# Patient Record
Sex: FEMALE | Race: WHITE | NOT HISPANIC OR LATINO | Employment: UNEMPLOYED | URBAN - METROPOLITAN AREA
[De-identification: names, ages, dates, MRNs, and addresses within clinical notes are randomized per-mention and may not be internally consistent; named-entity substitution may affect disease eponyms.]

---

## 2017-05-19 ENCOUNTER — NON-PROVIDER VISIT (OUTPATIENT)
Dept: URGENT CARE | Facility: CLINIC | Age: 22
End: 2017-05-19

## 2017-05-19 DIAGNOSIS — Z02.1 PRE-EMPLOYMENT DRUG SCREENING: ICD-10-CM

## 2017-05-19 LAB
AMP AMPHETAMINE: NORMAL
COC COCAINE: NORMAL
INT CON NEG: NEGATIVE
INT CON POS: POSITIVE
MET METHAMPHETAMINES: NORMAL
OPI OPIATES: NORMAL
PCP PHENCYCLIDINE: NORMAL
POC DRUG COMMENT 753798-OCCUPATIONAL HEALTH: NORMAL
THC: NORMAL

## 2017-05-19 PROCEDURE — 80305 DRUG TEST PRSMV DIR OPT OBS: CPT | Performed by: NURSE PRACTITIONER

## 2017-06-25 ENCOUNTER — APPOINTMENT (OUTPATIENT)
Dept: RADIOLOGY | Facility: MEDICAL CENTER | Age: 22
End: 2017-06-25
Attending: EMERGENCY MEDICINE
Payer: MEDICAID

## 2017-06-25 ENCOUNTER — HOSPITAL ENCOUNTER (EMERGENCY)
Facility: MEDICAL CENTER | Age: 22
End: 2017-06-25
Attending: EMERGENCY MEDICINE
Payer: MEDICAID

## 2017-06-25 VITALS
BODY MASS INDEX: 38.64 KG/M2 | HEART RATE: 84 BPM | WEIGHT: 210 LBS | RESPIRATION RATE: 16 BRPM | HEIGHT: 62 IN | OXYGEN SATURATION: 97 % | DIASTOLIC BLOOD PRESSURE: 81 MMHG | SYSTOLIC BLOOD PRESSURE: 134 MMHG | TEMPERATURE: 98.2 F

## 2017-06-25 DIAGNOSIS — S52.124A CLOSED NONDISPLACED FRACTURE OF HEAD OF RIGHT RADIUS, INITIAL ENCOUNTER: ICD-10-CM

## 2017-06-25 PROCEDURE — 29125 APPL SHORT ARM SPLINT STATIC: CPT

## 2017-06-25 PROCEDURE — 700111 HCHG RX REV CODE 636 W/ 250 OVERRIDE (IP): Performed by: EMERGENCY MEDICINE

## 2017-06-25 PROCEDURE — 99284 EMERGENCY DEPT VISIT MOD MDM: CPT

## 2017-06-25 PROCEDURE — 96372 THER/PROPH/DIAG INJ SC/IM: CPT

## 2017-06-25 PROCEDURE — 302875 HCHG BANDAGE ACE 4 OR 6""

## 2017-06-25 PROCEDURE — 73090 X-RAY EXAM OF FOREARM: CPT | Mod: RT

## 2017-06-25 RX ORDER — METHADONE HYDROCHLORIDE 40 MG/1
40 TABLET ORAL DAILY
COMMUNITY

## 2017-06-25 RX ADMIN — HYDROMORPHONE HYDROCHLORIDE 1 MG: 1 INJECTION, SOLUTION INTRAMUSCULAR; INTRAVENOUS; SUBCUTANEOUS at 16:28

## 2017-06-25 RX ADMIN — HYDROMORPHONE HYDROCHLORIDE 1 MG: 1 INJECTION, SOLUTION INTRAMUSCULAR; INTRAVENOUS; SUBCUTANEOUS at 14:54

## 2017-06-25 ASSESSMENT — PAIN SCALES - WONG BAKER: WONGBAKER_NUMERICALRESPONSE: HURTS A WHOLE LOT

## 2017-06-25 NOTE — ED AVS SNAPSHOT
Cranberry Chic Access Code: [email or web address]  Expires: 7/25/2017  5:18 PM    Cranberry Chic  A secure, online tool to manage your health information     [email or web address]’s Cranberry Chic® is a secure, online tool that connects you to your personalized health information from the privacy of your home -- day or night - making it very easy for you to manage your healthcare. Once the activation process is completed, you can even access your medical information using the Cranberry Chic sophie, which is available for free in the Apple Sophie store or Google Play store.     Cranberry Chic provides the following levels of access (as shown below):   My Chart Features   [facility] Primary Care Doctor [facility]  Specialists [facility]  Urgent  Care Non-[facility]  Primary Care  Doctor   Email your healthcare team securely and privately 24/7 X X X X   Manage appointments: schedule your next appointment; view details of past/upcoming appointments X      Request prescription refills. X      View recent personal medical records, including lab and immunizations X X X X   View health record, including health history, allergies, medications X X X X   Read reports about your outpatient visits, procedures, consult and ER notes X X X X   See your discharge summary, which is a recap of your hospital and/or ER visit that includes your diagnosis, lab results, and care plan. X X       How to register for Cranberry Chic:  1. Go to  [email or web address].  2. Click on the Sign Up Now box, which takes you to the New Member Sign Up page. You will need to provide the following information:  a. Enter your Cranberry Chic Access Code exactly as it appears at the top of this page. (You will not need to use this code after you’ve completed the sign-up process. If you do not sign up before the expiration date, you must request a new code.)   b. Enter your date of birth.   c. Enter your home email address.   d. Click Submit, and follow the next screen’s instructions.  3. Create a Cranberry Chic ID. This will be your Cranberry Chic  login ID and cannot be changed, so think of one that is secure and easy to remember.  4. Create a [employer]gig password. You can change your password at any time.  5. Enter your Password Reset Question and Answer. This can be used at a later time if you forget your password.   6. Enter your e-mail address. This allows you to receive e-mail notifications when new information is available in 5gig.  7. Click Sign Up. You can now view your health information.    For assistance activating your [employer]gig account, call [phone]

## 2017-06-25 NOTE — ED AVS SNAPSHOT
Home Care Instructions                                                                                                                Elisha Hubbard   MRN: [id number]    Department:  [facility], Emergency Dept   Date of Visit:  6/25/2017            [facility], Emergency Dept    [address]    [place] [address]    Phone:  [phone]      You were seen by     Mao Payne M.D.      Your Diagnosis Was     Closed nondisplaced fracture of head of right radius, initial encounter     S52.124A       These are the medications you received during your hospitalization from 06/25/2017 1406 to 06/25/2017 1718     Date/Time Order Dose Route Action    06/25/2017 1454 HYDROmorphone (DILAUDID) injection 1 mg 1 mg Intramuscular Given    06/25/2017 1628 HYDROmorphone (DILAUDID) injection 1 mg 1 mg Intramuscular Given      Follow-up Information     1. Follow up with Earnest Be M.D. In 1 day.    Specialty:  Orthopaedics    Contact information    [address]  [address]  [place] [id number] [phone]        Medication Information     Review all of your home medications and newly ordered medications with your primary doctor and/or pharmacist as soon as possible. Follow medication instructions as directed by your doctor and/or pharmacist.     Please keep your complete medication list with you and share with your physician. Update the information when medications are discontinued, doses are changed, or new medications (including over-the-counter products) are added; and carry medication information at all times in the event of emergency situations.               Medication List      ASK your doctor about these medications        Instructions    Morning Afternoon Evening Bedtime    albuterol 108 (90 BASE) MCG/ACT Aers inhalation aerosol        Inhale 2 Puffs by mouth every 6 hours as needed for Shortness of Breath.   Dose:  2 Puff                        methadone 40 MG Tbso     Commonly known as:  DOLOPHINE        Take 40 mg by mouth every day.   Dose:  40 mg                                Procedures and tests performed during your visit     DX-FOREARM RIGHT        Discharge Instructions       Radial Head Fracture  A radial head fracture is a break of the smaller bone (radius) in the forearm. The head of this bone is the part near the elbow. These fractures commonly happen during a fall, when you land on an outstretched arm. These fractures are more common in middle aged adults and are common with a dislocation of the elbow.  SYMPTOMS   · Swelling of the elbow joint and pain on the outside of the elbow.  · Pain and difficulty in bending or straightening the elbow.  · Pain and difficulty in turning the palm of the hand up or down with the elbow bent.  DIAGNOSIS   Your caregiver may make this diagnosis by a physical exam. X-rays can confirm the type and amount of fracture. Sometimes a fracture that is not displaced cannot be seen on the original X-ray.  TREATMENT   Radial head fractures are classified according to the amount of movement (displacement) of parts from the normal position.   Type 1 Fractures  · Type 1 fractures are generally small fractures in which bone pieces remain together (nondisplaced fracture).  · The fracture may not be seen on initial X-rays. Usually if X-rays are repeated two to three weeks later, the fracture will show up. A splint or sling is used for a few days. Gentle early motion is used to prevent the elbow from becoming stiff. It should not be done vigorously or forced as this could displace the bone pieces.  Type 2 Fractures  · With type 2 fractures, bone pieces are slightly displaced and larger pieces of bone are broken off.  · If only a little displacement of the bone piece is present, splinting for 4 to 5 days usually works well. This is again followed with gentle active range of motion. Small fragments may be surgically removed.  · Large pieces of bone  that can be put back into place will sometimes be fixed with pins or screws to hold them until the bone is healed. If this cannot be done, the fragments are removed. For older, less active people, sometimes the entire radial head is removed if the wrist is not injured. The elbow and arm will still work fine. Soft tissue, tendon, and ligament injuries are corrected at the same time.  Type 3 Fractures  · Type 3 fractures have multiple broken pieces of bone that cannot be fixed. Surgery is usually needed to remove the broken bits of bone and what is left of the radial head. Soft-tissue damage is repaired. Gentle early motion is used to prevent the elbow from becoming stiff. Sometimes an artificial radial head can be used to prevent deformity if the elbow is unstable.  Rest, ice, elevation, immobilization, medications, and pain control are used in the early care.  HOME CARE INSTRUCTIONS   · Keep the injured part elevated while sitting or lying down. Keep the injury above the level of your heart (the center of the chest). This will decrease swelling and pain.  · Apply ice to the injury for 15-20 minutes, 03-04 times per day while awake, for 2 days. Put the ice in a plastic bag and place a towel between the bag of ice and your cast or splint.  · Move your fingers to avoid stiffness and minimize swelling.  · If you have a plaster or fiberglass cast:  ¨ Do not try to scratch the skin under the cast using sharp or pointed objects.  ¨ Check the skin around the cast every day. You may put lotion on any red or sore areas.  ¨ Keep your cast dry and clean.  · If you have a plaster splint:  ¨ Wear the splint as directed.  ¨ You may loosen the elastic around the splint if your fingers become numb, tingle, or turn cold or blue.  · Do not put pressure on any part of your cast or splint. It may break. Rest your cast only on a pillow for the first 24 hours until it is fully hardened.  · Your cast or splint can be protected during  bathing with a plastic bag. Do not lower the cast or splint into the water.  · Only take over-the-counter or prescription medicines for pain, discomfort, or fever as directed by your caregiver.  · Follow all instructions for follow-up with your caregiver. This includes any orthopedic referrals, physical therapy, and rehabilitation. Any delay in obtaining necessary care could result in a delay or failure of the bones to heal or permanent elbow stiffness.  · Do not overdo exercises. This could further damage your injury.  SEEK IMMEDIATE MEDICAL CARE IF:   · Your cast or splint gets damaged or breaks.  · You have more severe pain or swelling than you did before getting the cast.  · You have severe pain when stretching your fingers.  · There is a bad smell, new stains, and/or pus-like (purulent) drainage coming from under the cast.  · Your fingers or hand turn pale or blue, become cold, or you lose feeling.     This information is not intended to replace advice given to you by your health care provider. Make sure you discuss any questions you have with your health care provider.     Document Released: 10/09/2007 Document Revised: 01/08/2016 Document Reviewed: 11/16/2010  [employer] Interactive Patient Education ©2016 [employer]            Patient Information     Patient Information    Following emergency treatment: all patient requiring follow-up care must return either to a private physician or a clinic if your condition worsens before you are able to obtain further medical attention, please return to the emergency room.     Billing Information    At [facility], we work to make the billing process streamlined for our patients.  Our Representatives are here to answer any questions you may have regarding your hospital bill.  If you have insurance coverage and have supplied your insurance information to us, we will submit a claim to your insurer on your behalf.  Should you have any questions regarding your bill, we  can be reached online or by phone as follows:  Online: You are able pay your bills online or live chat with our representatives about any billing questions you may have. We are here to help Monday - Friday from 8:00am to 7:30pm and 9:00am - 12:00pm on Saturdays.  Please visit [email or web address]  for more information.   Phone:  [phone] or [phone]    Please note that your emergency physician, surgeon, pathologist, radiologist, anesthesiologist, and other specialists are not employed by [facility] and will therefore bill separately for their services.  Please contact them directly for any questions concerning their bills at the numbers below:     Emergency Physician Services:  [phone]  [facility]:  [phone]  [facility]:  [phone]  [facility]:  [phone]    1. Your final bill may vary from the amount quoted upon discharge if all procedures are not complete at that time, or if your doctor has additional procedures of which we are not aware. You will receive an additional bill if you return to the Emergency Department at [facility] for suture removal regardless of the facility of which the sutures were placed.     2. Please arrange for settlement of this account at the emergency registration.    3. All self-pay accounts are due in full at the time of treatment.  If you are unable to meet this obligation then payment is expected within 4-5 days.     4. If you have had radiology studies (CT, X-ray, Ultrasound, MRI), you have received a preliminary result during your emergency department visit. Please contact the radiology department [phone] to receive a copy of your final result. Please discuss the Final result with your primary physician or with the follow up physician provided.     Crisis Hotline:  [facility] Crisis Hotline:  3-014-TVJNCSV or 1-815.797.3895  [place] Crisis Hotline:    1-510.577.7160 or  404.851.1856         ED Discharge Follow Up Questions    1. In order to provide you with very good care, we would like to follow up with a phone call in the next few days.  May we have your permission to contact you?     YES /  NO    2. What is the best phone number to call you? (       )_____-__________    3. What is the best time to call you?      Morning  /  Afternoon  /  Evening                   Patient Signature:  ____________________________________________________________    Date:  ____________________________________________________________

## 2017-06-25 NOTE — ED AVS SNAPSHOT
6/25/2017    Elisha Hubbard  [address] [facility] [address]    Dear Elisha:    [facility] wants to ensure your discharge home is safe and you or your loved ones have had all of your questions answered regarding your care after you leave the hospital.    Below is a list of resources and contact information should you have any questions regarding your hospital stay, follow-up instructions, or active medical symptoms.    Questions or Concerns Regarding… Contact   Medical Questions Related to Your Discharge  (7 days a week, 8am-5pm) Contact a Nurse Care Coordinator   [phone]   Medical Questions Not Related to Your Discharge  (24 hours a day / 7 days a week)  Contact the Nurse Health Line   [phone]    Medications or Discharge Instructions Refer to your discharge packet   or contact your [facility] Primary Care Provider   [phone]   Follow-up Appointment(s) Schedule your appointment via [employer]   or contact Scheduling [phone]   Billing Review your statement via [employer]  or contact Billing [phone]   Medical Records Review your records via [employer]   or contact Medical Records [phone]     You may receive a telephone call within two days of discharge. This call is to make certain you understand your discharge instructions and have the opportunity to have any questions answered. You can also easily access your medical information, test results and upcoming appointments via the [employer] free online health management tool. You can learn more and sign up at [employer]. For assistance setting up your [employer] account, please call [phone].    Once again, we want to ensure your discharge home is safe and that you have a clear understanding of any next steps in your care. If you have any questions or concerns, please do not hesitate to contact us, we are here for you. Thank you for choosing [facility] for your healthcare needs.    Sincerely,    Your [facility] Healthcare Team

## 2017-06-25 NOTE — ED NOTES
Chief Complaint   Patient presents with   • T-5000 GLF     Patient bib EMS, states she stepped off curb and felt right knee cap dislocate then fell to the ground catching herself with her right arm. Patient states she feels her knee cap is back in place, but right forearm in pain. Splint placed in route. CMS intact. ERP to [facility].

## 2017-06-25 NOTE — DISCHARGE INSTRUCTIONS
Radial Head Fracture  A radial head fracture is a break of the smaller bone (radius) in the forearm. The head of this bone is the part near the elbow. These fractures commonly happen during a fall, when you land on an outstretched arm. These fractures are more common in middle aged adults and are common with a dislocation of the elbow.  SYMPTOMS   · Swelling of the elbow joint and pain on the outside of the elbow.  · Pain and difficulty in bending or straightening the elbow.  · Pain and difficulty in turning the palm of the hand up or down with the elbow bent.  DIAGNOSIS   Your caregiver may make this diagnosis by a physical exam. X-rays can confirm the type and amount of fracture. Sometimes a fracture that is not displaced cannot be seen on the original X-ray.  TREATMENT   Radial head fractures are classified according to the amount of movement (displacement) of parts from the normal position.   Type 1 Fractures  · Type 1 fractures are generally small fractures in which bone pieces remain together (nondisplaced fracture).  · The fracture may not be seen on initial X-rays. Usually if X-rays are repeated two to three weeks later, the fracture will show up. A splint or sling is used for a few days. Gentle early motion is used to prevent the elbow from becoming stiff. It should not be done vigorously or forced as this could displace the bone pieces.  Type 2 Fractures  · With type 2 fractures, bone pieces are slightly displaced and larger pieces of bone are broken off.  · If only a little displacement of the bone piece is present, splinting for 4 to 5 days usually works well. This is again followed with gentle active range of motion. Small fragments may be surgically removed.  · Large pieces of bone that can be put back into place will sometimes be fixed with pins or screws to hold them until the bone is healed. If this cannot be done, the fragments are removed. For older, less active people, sometimes the entire radial  head is removed if the wrist is not injured. The elbow and arm will still work fine. Soft tissue, tendon, and ligament injuries are corrected at the same time.  Type 3 Fractures  · Type 3 fractures have multiple broken pieces of bone that cannot be fixed. Surgery is usually needed to remove the broken bits of bone and what is left of the radial head. Soft-tissue damage is repaired. Gentle early motion is used to prevent the elbow from becoming stiff. Sometimes an artificial radial head can be used to prevent deformity if the elbow is unstable.  Rest, ice, elevation, immobilization, medications, and pain control are used in the early care.  HOME CARE INSTRUCTIONS   · Keep the injured part elevated while sitting or lying down. Keep the injury above the level of your heart (the center of the chest). This will decrease swelling and pain.  · Apply ice to the injury for 15-20 minutes, 03-04 times per day while awake, for 2 days. Put the ice in a plastic bag and place a towel between the bag of ice and your cast or splint.  · Move your fingers to avoid stiffness and minimize swelling.  · If you have a plaster or fiberglass cast:  ¨ Do not try to scratch the skin under the cast using sharp or pointed objects.  ¨ Check the skin around the cast every day. You may put lotion on any red or sore areas.  ¨ Keep your cast dry and clean.  · If you have a plaster splint:  ¨ Wear the splint as directed.  ¨ You may loosen the elastic around the splint if your fingers become numb, tingle, or turn cold or blue.  · Do not put pressure on any part of your cast or splint. It may break. Rest your cast only on a pillow for the first 24 hours until it is fully hardened.  · Your cast or splint can be protected during bathing with a plastic bag. Do not lower the cast or splint into the water.  · Only take over-the-counter or prescription medicines for pain, discomfort, or fever as directed by your caregiver.  · Follow all instructions for  follow-up with your caregiver. This includes any orthopedic referrals, physical therapy, and rehabilitation. Any delay in obtaining necessary care could result in a delay or failure of the bones to heal or permanent elbow stiffness.  · Do not overdo exercises. This could further damage your injury.  SEEK IMMEDIATE MEDICAL CARE IF:   · Your cast or splint gets damaged or breaks.  · You have more severe pain or swelling than you did before getting the cast.  · You have severe pain when stretching your fingers.  · There is a bad smell, new stains, and/or pus-like (purulent) drainage coming from under the cast.  · Your fingers or hand turn pale or blue, become cold, or you lose feeling.     This information is not intended to replace advice given to you by your health care provider. Make sure you discuss any questions you have with your health care provider.     Document Released: 10/09/2007 Document Revised: 01/08/2016 Document Reviewed: 11/16/2010  [employer] Interactive Patient Education ©2016 [employer]

## 2017-06-25 NOTE — ED PROVIDER NOTES
"ED Provider Note    CHIEF COMPLAINT  Chief Complaint   Patient presents with   • T-5000 GLF       HPI  Elisha Hubbard is a 22 y.o. female who presents for evaluation of the right forearm injury sustained just prior to arrival when she fell in the yard cell and landed on her right forearm. She states she's had a fracture in this region status post operative repair. She also states that she injured her right knee, she suspects it was another patella dislocation is spontaneously reduced. No weakness numbness or tingling, no neck or back pain and no other complaint offered by the patient at this time. She does report taking chronic methadone but missed her morning dose.    REVIEW OF SYSTEMS  Negative for back pain, neck pain, weakness, numbness, tingling.    PAST MEDICAL HISTORY   has a past medical history of Asthma; Kidney infection; Migraine; Migraine; and H. pylori infection.    SOCIAL HISTORY  Social History     Social History Main Topics   • Smoking status: Never Smoker    • Smokeless tobacco: Not on file   • Alcohol Use: No   • Drug Use: No   • Sexual Activity: Not on file       SURGICAL HISTORY   has past surgical history that includes appendectomy (11/28/2009); tonsillectomy and adenoidectomy; and appendectomy (11/2009).    CURRENT MEDICATIONS  I personally reviewed the medication list in the charting documentation.     ALLERGIES  Allergies   Allergen Reactions   • Nkda [No Known Drug Allergy]    • Tape        PHYSICAL EXAM  VITAL SIGNS: /76 mmHg  Pulse 78  Temp(Src) 36.9 °C (98.4 °F)  Resp 16  Ht 1.575 m (5' 2\")  Wt 95.255 kg (210 lb)  BMI 38.40 kg/m2  Constitutional: Alert in no apparent distress.  HENT: No signs of trauma.   Eyes: Conjunctiva normal, Non-icteric.   Chest: Normal nonlabored respirations  Skin: No erythema, No rash.   Musculoskeletal: Right forearm is splinted, once the splint is removed there is no evidence of deformity, neurovascularly intact distally with normal sensation in the " radial, ulnar and median nerve distributions of range of motion of the fingers. Tenderness involving the entire length of the forearm including elbow. No upper arm or shoulder tenderness. Additionally, there is a abrasion overlying the right knee but no deformity and neurovascularly intact distally.  Neurologic: Alert, No focal deficits noted.   Psychiatric: Affect normal, Judgment normal.    DIAGNOSTIC STUDIES / PROCEDURES    RADIOLOGY  DX-FOREARM RIGHT   Final Result      Right radial head/neck fracture            COURSE & MEDICAL DECISION MAKING  Pertinent Labs & Imaging studies reviewed. (See chart for details)    Encounter Summary: This is a 22 y.o. female with a right forearm injury status post ground level fall, fracture versus contusion, will obtain an x-ray. She is neurovascularly intact. Will medicate with a single dose of IM Dilaudid. Additionally she reports a patellar dislocation that reduced spontaneously, no need for imaging. She is neurovascular intact distally there is well.-------- x-ray reveals a radial head fracture, patient has been splinted in a sugar tong splint and a sling, will follow up with the orthopedic surgeon. I will not prescribe any opiate-based pain medication at this patient has a pain management physician and is on methadone. Strict return instructions discussed.      DISPOSITION: Discharge Home      FINAL IMPRESSION  1. Closed nondisplaced fracture of head of right radius, initial encounter        This dictation was created using voice recognition software. The accuracy of the dictation is limited to the abilities of the software. I expect there may be some errors of grammar and possibly content. The nursing notes were reviewed and certain aspects of this information were incorporated into this note.    Electronically signed by: Mao Payne, 6/25/2017 2:52 PM

## 2017-06-26 NOTE — ED NOTES
Pt given d/c instructions, given follow up instructions. All questions answered. Pt verbalized understanding. Pt d/c with family.

## 2018-02-20 ENCOUNTER — HOSPITAL ENCOUNTER (OUTPATIENT)
Facility: MEDICAL CENTER | Age: 23
End: 2018-02-20
Attending: NURSE PRACTITIONER
Payer: COMMERCIAL

## 2018-02-20 ENCOUNTER — NON-PROVIDER VISIT (OUTPATIENT)
Dept: URGENT CARE | Facility: CLINIC | Age: 23
End: 2018-02-20

## 2018-02-20 DIAGNOSIS — Z02.89 ENCOUNTER FOR OCCUPATIONAL HEALTH ASSESSMENT: ICD-10-CM

## 2018-02-20 PROCEDURE — 83655 ASSAY OF LEAD: CPT | Performed by: NURSE PRACTITIONER

## 2018-02-24 LAB — TEST NAME 95000: ABNORMAL

## 2018-07-18 ENCOUNTER — HOSPITAL ENCOUNTER (EMERGENCY)
Facility: MEDICAL CENTER | Age: 23
End: 2018-07-18
Attending: EMERGENCY MEDICINE
Payer: MEDICAID

## 2018-07-18 ENCOUNTER — APPOINTMENT (OUTPATIENT)
Dept: RADIOLOGY | Facility: MEDICAL CENTER | Age: 23
End: 2018-07-18
Attending: EMERGENCY MEDICINE
Payer: MEDICAID

## 2018-07-18 ENCOUNTER — OFFICE VISIT (OUTPATIENT)
Dept: URGENT CARE | Facility: CLINIC | Age: 23
End: 2018-07-18
Payer: MEDICAID

## 2018-07-18 VITALS
SYSTOLIC BLOOD PRESSURE: 117 MMHG | HEART RATE: 51 BPM | DIASTOLIC BLOOD PRESSURE: 71 MMHG | RESPIRATION RATE: 17 BRPM | WEIGHT: 221.56 LBS | OXYGEN SATURATION: 96 % | BODY MASS INDEX: 39.26 KG/M2 | HEIGHT: 63 IN | TEMPERATURE: 97.8 F

## 2018-07-18 VITALS
RESPIRATION RATE: 16 BRPM | BODY MASS INDEX: 40.82 KG/M2 | DIASTOLIC BLOOD PRESSURE: 76 MMHG | HEIGHT: 62 IN | OXYGEN SATURATION: 95 % | SYSTOLIC BLOOD PRESSURE: 118 MMHG | TEMPERATURE: 97.9 F | WEIGHT: 221.8 LBS | HEART RATE: 80 BPM

## 2018-07-18 DIAGNOSIS — R07.9 CHEST PAIN, UNSPECIFIED TYPE: ICD-10-CM

## 2018-07-18 DIAGNOSIS — R11.0 NAUSEA: ICD-10-CM

## 2018-07-18 DIAGNOSIS — F11.90 HEROIN USE: ICD-10-CM

## 2018-07-18 DIAGNOSIS — R10.13 EPIGASTRIC PAIN: ICD-10-CM

## 2018-07-18 DIAGNOSIS — F11.90 METHADONE USE: ICD-10-CM

## 2018-07-18 LAB
ALBUMIN SERPL BCP-MCNC: 4.9 G/DL (ref 3.2–4.9)
ALBUMIN/GLOB SERPL: 1.9 G/DL
ALP SERPL-CCNC: 82 U/L (ref 30–99)
ALT SERPL-CCNC: 13 U/L (ref 2–50)
ANION GAP SERPL CALC-SCNC: 11 MMOL/L (ref 0–11.9)
APTT PPP: 32.9 SEC (ref 24.7–36)
AST SERPL-CCNC: 15 U/L (ref 12–45)
BASOPHILS # BLD AUTO: 0.8 % (ref 0–1.8)
BASOPHILS # BLD: 0.09 K/UL (ref 0–0.12)
BILIRUB SERPL-MCNC: 0.7 MG/DL (ref 0.1–1.5)
BNP SERPL-MCNC: 19 PG/ML (ref 0–100)
BUN SERPL-MCNC: 11 MG/DL (ref 8–22)
CALCIUM SERPL-MCNC: 9.8 MG/DL (ref 8.5–10.5)
CHLORIDE SERPL-SCNC: 105 MMOL/L (ref 96–112)
CO2 SERPL-SCNC: 24 MMOL/L (ref 20–33)
CREAT SERPL-MCNC: 0.83 MG/DL (ref 0.5–1.4)
DEPRECATED D DIMER PPP IA-ACNC: <200 NG/ML(D-DU)
EKG IMPRESSION: NORMAL
EOSINOPHIL # BLD AUTO: 0.27 K/UL (ref 0–0.51)
EOSINOPHIL NFR BLD: 2.3 % (ref 0–6.9)
ERYTHROCYTE [DISTWIDTH] IN BLOOD BY AUTOMATED COUNT: 41.5 FL (ref 35.9–50)
GLOBULIN SER CALC-MCNC: 2.6 G/DL (ref 1.9–3.5)
GLUCOSE SERPL-MCNC: 106 MG/DL (ref 65–99)
HCT VFR BLD AUTO: 44.4 % (ref 37–47)
HGB BLD-MCNC: 14.6 G/DL (ref 12–16)
IMM GRANULOCYTES # BLD AUTO: 0.04 K/UL (ref 0–0.11)
IMM GRANULOCYTES NFR BLD AUTO: 0.3 % (ref 0–0.9)
INR PPP: 1.17 (ref 0.87–1.13)
LIPASE SERPL-CCNC: 12 U/L (ref 11–82)
LYMPHOCYTES # BLD AUTO: 3.42 K/UL (ref 1–4.8)
LYMPHOCYTES NFR BLD: 28.9 % (ref 22–41)
MCH RBC QN AUTO: 30.1 PG (ref 27–33)
MCHC RBC AUTO-ENTMCNC: 32.9 G/DL (ref 33.6–35)
MCV RBC AUTO: 91.5 FL (ref 81.4–97.8)
MONOCYTES # BLD AUTO: 0.39 K/UL (ref 0–0.85)
MONOCYTES NFR BLD AUTO: 3.3 % (ref 0–13.4)
NEUTROPHILS # BLD AUTO: 7.63 K/UL (ref 2–7.15)
NEUTROPHILS NFR BLD: 64.4 % (ref 44–72)
NRBC # BLD AUTO: 0 K/UL
NRBC BLD-RTO: 0 /100 WBC
PLATELET # BLD AUTO: 326 K/UL (ref 164–446)
PMV BLD AUTO: 10.5 FL (ref 9–12.9)
POTASSIUM SERPL-SCNC: 3.9 MMOL/L (ref 3.6–5.5)
PROT SERPL-MCNC: 7.5 G/DL (ref 6–8.2)
PROTHROMBIN TIME: 14.6 SEC (ref 12–14.6)
RBC # BLD AUTO: 4.85 M/UL (ref 4.2–5.4)
SODIUM SERPL-SCNC: 140 MMOL/L (ref 135–145)
TROPONIN I SERPL-MCNC: <0.01 NG/ML (ref 0–0.04)
TROPONIN I SERPL-MCNC: <0.01 NG/ML (ref 0–0.04)
WBC # BLD AUTO: 11.8 K/UL (ref 4.8–10.8)

## 2018-07-18 PROCEDURE — 93005 ELECTROCARDIOGRAM TRACING: CPT

## 2018-07-18 PROCEDURE — 96374 THER/PROPH/DIAG INJ IV PUSH: CPT

## 2018-07-18 PROCEDURE — 85379 FIBRIN DEGRADATION QUANT: CPT

## 2018-07-18 PROCEDURE — 700111 HCHG RX REV CODE 636 W/ 250 OVERRIDE (IP): Performed by: EMERGENCY MEDICINE

## 2018-07-18 PROCEDURE — 84484 ASSAY OF TROPONIN QUANT: CPT

## 2018-07-18 PROCEDURE — 80053 COMPREHEN METABOLIC PANEL: CPT

## 2018-07-18 PROCEDURE — 93005 ELECTROCARDIOGRAM TRACING: CPT | Performed by: EMERGENCY MEDICINE

## 2018-07-18 PROCEDURE — 99203 OFFICE O/P NEW LOW 30 MIN: CPT | Performed by: NURSE PRACTITIONER

## 2018-07-18 PROCEDURE — 85610 PROTHROMBIN TIME: CPT

## 2018-07-18 PROCEDURE — 71045 X-RAY EXAM CHEST 1 VIEW: CPT

## 2018-07-18 PROCEDURE — 83690 ASSAY OF LIPASE: CPT

## 2018-07-18 PROCEDURE — 83880 ASSAY OF NATRIURETIC PEPTIDE: CPT

## 2018-07-18 PROCEDURE — 99285 EMERGENCY DEPT VISIT HI MDM: CPT

## 2018-07-18 PROCEDURE — 85730 THROMBOPLASTIN TIME PARTIAL: CPT

## 2018-07-18 PROCEDURE — 96375 TX/PRO/DX INJ NEW DRUG ADDON: CPT

## 2018-07-18 PROCEDURE — 93000 ELECTROCARDIOGRAM COMPLETE: CPT | Performed by: NURSE PRACTITIONER

## 2018-07-18 PROCEDURE — 85025 COMPLETE CBC W/AUTO DIFF WBC: CPT

## 2018-07-18 RX ORDER — DIPHENHYDRAMINE HYDROCHLORIDE 50 MG/ML
12.5 INJECTION INTRAMUSCULAR; INTRAVENOUS ONCE
Status: COMPLETED | OUTPATIENT
Start: 2018-07-18 | End: 2018-07-18

## 2018-07-18 RX ORDER — KETOROLAC TROMETHAMINE 30 MG/ML
10 INJECTION, SOLUTION INTRAMUSCULAR; INTRAVENOUS ONCE
Status: DISCONTINUED | OUTPATIENT
Start: 2018-07-18 | End: 2018-07-18

## 2018-07-18 RX ORDER — DEXAMETHASONE SODIUM PHOSPHATE 10 MG/ML
10 INJECTION, SOLUTION INTRAMUSCULAR; INTRAVENOUS ONCE
Status: COMPLETED | OUTPATIENT
Start: 2018-07-18 | End: 2018-07-18

## 2018-07-18 RX ORDER — KETOROLAC TROMETHAMINE 30 MG/ML
15 INJECTION, SOLUTION INTRAMUSCULAR; INTRAVENOUS ONCE
Status: COMPLETED | OUTPATIENT
Start: 2018-07-18 | End: 2018-07-18

## 2018-07-18 RX ADMIN — DEXAMETHASONE SODIUM PHOSPHATE 10 MG: 10 INJECTION, SOLUTION INTRAMUSCULAR; INTRAVENOUS at 17:19

## 2018-07-18 RX ADMIN — DIPHENHYDRAMINE HYDROCHLORIDE 12.5 MG: 50 INJECTION, SOLUTION INTRAMUSCULAR; INTRAVENOUS at 17:20

## 2018-07-18 RX ADMIN — KETOROLAC TROMETHAMINE 15 MG: 30 INJECTION, SOLUTION INTRAMUSCULAR at 17:23

## 2018-07-18 RX ADMIN — PROCHLORPERAZINE EDISYLATE 10 MG: 5 INJECTION INTRAMUSCULAR; INTRAVENOUS at 17:19

## 2018-07-18 ASSESSMENT — ENCOUNTER SYMPTOMS
BACK PAIN: 1
HEMOPTYSIS: 0
COUGH: 0
CHILLS: 0
SHORTNESS OF BREATH: 1
ORTHOPNEA: 0
DIAPHORESIS: 0
PALPITATIONS: 1
DIZZINESS: 0
VOMITING: 0
ABDOMINAL PAIN: 1
WHEEZING: 0
BLOOD IN STOOL: 0
WEAKNESS: 0
CONSTIPATION: 0
NAUSEA: 1
MYALGIAS: 0
DIARRHEA: 0
SPUTUM PRODUCTION: 0
FLANK PAIN: 0
FEVER: 0

## 2018-07-18 ASSESSMENT — PAIN SCALES - GENERAL: PAINLEVEL_OUTOF10: 8

## 2018-07-18 NOTE — ED PROVIDER NOTES
"ED Provider Note    Scribed for Tolu Archer M.D. by Isaiah Castellanos. 7/18/2018,  4:38 PM.    CHIEF COMPLAINT  Chief Complaint   Patient presents with   • Chest Pain   • Shortness of Breath       HPI  Elisha Hubbard is a 23 y.o. female who presents to the Emergency Department complaining of chest pain onset yesterday morning. Patient states was clean for 5 days until she relapsed on heroin 5 days ago. She took the drug for two days and then stopped. Patient states yesterday morning, she woke up with her chest feeling heavy and full. She stayed laying down for a minute thinking it was heartburn. However, upon standing up she experienced excruciating mid-chest pain that radiates to the center of her back. This pain was intermittent throughout yesterday and has been constant today. Patient also notes having palpitations described as \"fluttering.\" These palpitations also trigger the chest pain. She also reports shortness of breath and coughing \"to catch my breath.\" She mentions having a migraine. She denies any recent measured fevers, or pain or swelling to her fingers. Patient mentions when she did heroin 5 years ago, she had a severe blood infection. Patient denies any history of blood clots. She notes being a methadone patient. She denies any known medication allergies.        REVIEW OF SYSTEMS  See HPI for further details. All other systems are negative.   C    PAST MEDICAL HISTORY   has a past medical history of ASTHMA; H. pylori infection; Kidney infection; Migraine; and Migraine.    SOCIAL HISTORY  Social History     Social History Main Topics   • Smoking status: Current Every Day Smoker     Packs/day: 0.50     Types: Cigarettes   • Smokeless tobacco: Never Used   • Alcohol use No   • Drug use: Yes     Types: Intravenous      Comment: heroin; in recovery, relapsed 4 days ago 7/14     History   Drug Use   • Types: Intravenous     Comment: heroin; in recovery, relapsed 4 days ago 7/14 " "      SURGICAL HISTORY   has a past surgical history that includes appendectomy (11/28/2009); tonsillectomy and adenoidectomy; and appendectomy (11/2009).    CURRENT MEDICATIONS  Home Medications     Reviewed by Johana Leyva R.N. (Registered Nurse) on 07/18/18 at 1337  Med List Status: Complete   Medication Last Dose Status   albuterol (VENTOLIN OR PROVENTIL) 108 (90 BASE) MCG/ACT AERS prn Active   methadone (DOLOPHINE) 40 MG TABLET SOLUBLE 7/18/2018 Active                ALLERGIES  Allergies   Allergen Reactions   • Latex Hives   • Nkda [No Known Drug Allergy]    • Tape        PHYSICAL EXAM  VITAL SIGNS: /71   Pulse 68   Temp 36.6 °C (97.8 °F)   Resp 15   Ht 1.6 m (5' 3\")   Wt 100.5 kg (221 lb 9 oz)   LMP 07/04/2018 (Within Days)   SpO2 99%   BMI 39.25 kg/m²   Pulse ox interpretation: I interpret this pulse ox as normal.  Constitutional: Alert in no apparent distress.  HENT: No signs of trauma, Bilateral external ears normal, Nose normal.   Eyes: Conjunctiva normal, Non-icteric.   Neck: Normal range of motion, Supple, No stridor.   Lymphatic: No lymphadenopathy noted.   Cardiovascular: Regular rate and rhythm, no murmurs.   Thorax & Lungs: Normal breath sounds, No respiratory distress, No wheezing, No chest tenderness.   Skin: Warm, Dry, No erythema, No rash.   Back: No midline bony tenderness.   Extremities: Intact distal pulses, No edema, No cyanosis.  Musculoskeletal: Good range of motion in all major joints. No or major deformities noted.   Neurologic: Alert , Normal motor function, Normal sensory function, No focal deficits noted.   Psychiatric: Affect normal, Judgment normal, Mood normal.       DIAGNOSTIC STUDIES / PROCEDURES    EKG #1  Interpreted by me    Rhythm:  Normal sinus rhythm   Rate: 59  Axis: normal  Intervals: normal  Ectopy: none  Conduction: normal  ST Segments: no acute change  T Waves: no acute change  Q Waves: none  Old EKG shows no significant changes.    EKG #2: "   Interpreted by me    Rhythm:  Normal sinus rhythm   Rate: 46  Axis: normal  Intervals: normal  Ectopy: none  Conduction: normal  ST Segments: no acute change  T Waves: no acute change  Q Waves: none  Old EKG from today shows no significant changes.    LABS  Labs Reviewed   CBC WITH DIFFERENTIAL - Abnormal; Notable for the following:        Result Value    WBC 11.8 (*)     MCHC 32.9 (*)     Neutrophils (Absolute) 7.63 (*)     All other components within normal limits    Narrative:     Indicate which anticoagulants the patient is on:->UNKNOWN   COMP METABOLIC PANEL - Abnormal; Notable for the following:     Glucose 106 (*)     All other components within normal limits    Narrative:     Indicate which anticoagulants the patient is on:->UNKNOWN   PROTHROMBIN TIME - Abnormal; Notable for the following:     INR 1.17 (*)     All other components within normal limits    Narrative:     Indicate which anticoagulants the patient is on:->UNKNOWN   BTYPE NATRIURETIC PEPTIDE    Narrative:     Indicate which anticoagulants the patient is on:->UNKNOWN   APTT    Narrative:     Indicate which anticoagulants the patient is on:->UNKNOWN   LIPASE    Narrative:     Indicate which anticoagulants the patient is on:->UNKNOWN   TROPONIN    Narrative:     Indicate which anticoagulants the patient is on:->UNKNOWN   D-DIMER    Narrative:     Indicate which anticoagulants the patient is on:->UNKNOWN   ESTIMATED GFR    Narrative:     Indicate which anticoagulants the patient is on:->UNKNOWN   TROPONIN    Narrative:     Repeat     All labs reviewed by me.    RADIOLOGY  DX-CHEST-PORTABLE (1 VIEW)    (Results Pending)     The radiologist's interpretation of all radiological studies have been reviewed by me.    COURSE & MEDICAL DECISION MAKING  Nursing notes, VS, PMSFHx reviewed in chart.     4:38 PM Patient seen and examined at bedside. Patient presents with chest pain onset yesterday. She reports recently relapsing on heroin. Discussed plan of  care which includes imaging and labs. Patient understands and agrees to plan. Differential diagnosis includes but is not limited to ACS, PE, pneumonia, anxiety, drug abuse. Ordered for DX chest, CBC, CMP, BNP, PT/INR, APTT, lipase, troponin stat, D-dimer, estimated GFR, EKG to evaluate.    6:52 PM this patient has had serial troponins and serial EKGs that did not show any sign of an ischemic cause of her chest pain.  Her d-dimer was negative.  There is no evidence of PE.  Her chest x-ray is unremarkable.  The exact cause of her chest discomfort is not clear, but there is no evidence of a dangerous cause.  I do not suspect aortic pathology.  There is no new heart murmur, and no embolic findings on her exam to suggest endocarditis or pericarditis.  She will be discharged to primary care follow-up with close return precautions.  We discussed indications for prompt immediate reevaluation.     The patient will return for new or worsening symptoms and is stable at the time of discharge.    The patient is referred to a primary physician for blood pressure management, diabetic screening, and for all other preventative health concerns.      DISPOSITION:  Patient will be discharged home in stable condition.    FOLLOW UP:  [address] [address]  [phone]        [facility]  [address]  [place] [address]  [phone]          [facility]  [address]  [place]  [phone]            OUTPATIENT MEDICATIONS:  New Prescriptions    No medications on file         FINAL IMPRESSION  1. Chest pain, unspecified type         I, Isaiah Castellanos (Rashaad), am scribing for, and in the presence of, Tolu Archer M.D..    Electronically signed by: Isaiah Castellanos (Norbertoibe), 7/18/2018    ITolu M.D. personally performed the services described in this documentation, as scribed by Isaiah Castellanos in my presence, and it is both accurate and  complete.    The note accurately reflects work and decisions made by me.  Tolu Archer  7/18/2018  6:53 PM

## 2018-07-18 NOTE — PROGRESS NOTES
Subjective:      Elisha Hubbard is a 23 y.o. female who presents with Chest Pain (chest pain/ left arm and leg numbness since yesterday )            Patient comes in today with a report of lower midline chest and epigastric region pain since yesterday.  She notes constant pain, worse with deep breathing.  Pain radiates to the back.  Associated symptoms include heartburn yesterday that was relieved with Tums.  Today she notes nausea without vomiting.  She has a decreased appetite.  She did eat [employer] for breakfast with no worsening or relieved symptoms.  Just prior to arrival she had a single 10 minute episode of pain in the left axilla and upper arm, concurrently with left leg numbness and left thigh cramping, which has since resolved.  She notes a sensation of heart fluttering and shortness of breath.  She has a history of heart murmur with no other known cardiovascular disease or significant history.  She currently smokes 4-5 cigarette per day.  She drinks approximately 64 ounces of Coca Cola per day, but none today.  She has been on Methadone for 4 years and reports that 4 days ago she had a relapse and used heroin.  She is under a lot of stress and was temporarily homeless last week.  She does have a job.  Surgical history: appendectomy, tonsillectomy, adenoidectomy.  She has been evaluated for gallstones in the past with negative ultrasound in 11/2011.        Review of Systems   Constitutional: Negative for chills, diaphoresis, fever and malaise/fatigue.   Respiratory: Positive for shortness of breath. Negative for cough, hemoptysis, sputum production and wheezing.    Cardiovascular: Positive for chest pain and palpitations. Negative for orthopnea and leg swelling.   Gastrointestinal: Positive for abdominal pain and nausea. Negative for blood in stool, constipation, diarrhea, melena and vomiting.   Genitourinary: Negative for dysuria, flank pain, frequency, hematuria and urgency.   Musculoskeletal: Positive  "for back pain. Negative for myalgias.   Neurological: Negative for dizziness and weakness.     Medications, Allergies, and current problem list reviewed today in Epic     Objective:     /76   Pulse 80   Temp 36.6 °C (97.9 °F)   Resp 16   Ht 1.575 m (5' 2\")   Wt 100.6 kg (221 lb 12.8 oz)   SpO2 95%   BMI 40.57 kg/m²      Physical Exam   Constitutional: She is oriented to person, place, and time. She appears well-developed and well-nourished. No distress.   Patient is more comfortable when supine.    Pleasant female patient provides detailed history.  BMI: 40.57; weight 221 lbs.   HENT:   Head: Normocephalic.   Mouth/Throat: No oropharyngeal exudate.   Eyes: Conjunctivae are normal. Right eye exhibits no discharge. Left eye exhibits no discharge. No scleral icterus.   Neck: Neck supple. No JVD present. No tracheal deviation present. No thyromegaly present.   Cardiovascular: Normal rate, regular rhythm and normal heart sounds.  Exam reveals no gallop and no friction rub.    No murmur heard.  Pulmonary/Chest: Effort normal and breath sounds normal. No stridor. No respiratory distress. She has no wheezes. She has no rales. She exhibits no tenderness.   Abdominal: Soft. Bowel sounds are normal. She exhibits no shifting dullness, no distension, no pulsatile liver, no fluid wave, no ascites, no pulsatile midline mass and no mass. There is no hepatosplenomegaly. There is tenderness in the epigastric area and left lower quadrant. There is no rigidity, no rebound, no guarding, no CVA tenderness, no tenderness at McBurney's point and negative Case's sign.   Abdominal exam limited by obesity.   Musculoskeletal: She exhibits no edema.   Lymphadenopathy:     She has no cervical adenopathy.   Neurological: She is alert and oriented to person, place, and time. No cranial nerve deficit. She exhibits normal muscle tone. Coordination normal.   Skin: Skin is warm and dry. No rash noted. She is not diaphoretic. No " erythema.   Psychiatric: She has a normal mood and affect. Her behavior is normal. Thought content normal.   Vitals reviewed.    EKG:  First EKG: normal sinus rhythm.  No ST elevation or T-wave inversion.    Second EKG: Atrial flutter with variable AV block.  NO ST elevation or T-wave inversion.          Assessment/Plan:     1. Chest pain, unspecified type  EKG - Clinic Performed   2. Epigastric pain     3. Nausea     4. Methadone use (HCC)     5. Heroin use       Discussed exam findings with patient.  Differential reviewed with possible etiology including, but not limited to, cardiovascular versus GI etiology.  Recommended ambulance transport to the ED.  Patient refused ambulance transport; will travel via private car with  driving.  Risks of delayed care and refused ambulance transport discussed.  Patient verbalized understanding of recommended plan of care.  Call reported to [facility]: Dr. Sanders.

## 2018-07-18 NOTE — ED TRIAGE NOTES
"Elisha Hubbard  Chief Complaint   Patient presents with   • Chest Pain   • Shortness of Breath     Pt ambulatory to triage with above complaint. Pt states she was seen at  1 hr ago and was advised to come to ED for further work-up. Pt states yesterday she had a \"full\" feeling with CP and SOB. Last night pt woke up with palpitations and now c/o CP and SOB. Pt states pain radiates to back and down LEFT arm. Pt also admits to relapse on IV heroin 4 days ago, pt currently in recovery and taking methadone daily.    /71   Pulse 68   Temp 36.6 °C (97.8 °F)   Resp 15   Ht 1.6 m (5' 3\")   Wt 100.5 kg (221 lb 9 oz)   SpO2 99%   BMI 39.25 kg/m²     Pt informed of triage process and encouraged to notify staff of any changes or concerns. Pt verbalized understanding of instructions. Apologized for long wait time. Pt placed back in lobby.     "

## 2018-07-19 NOTE — ED NOTES
.Patient states understanding of discharge instructions. Ambulated with steady gait out of ED. Personal belongings with patient.

## 2018-07-19 NOTE — DISCHARGE INSTRUCTIONS
All of your tests here were very reassuring.  There is no evidence today that your chest discomfort is related to any damage to your heart or lungs, or to a blood clot.  The exact cause of your pain is not clear.  Fortunately, there is no evidence of a dangerous cause at this time, but it is important that you follow-up with primary care.  If you do not have a primary care doctor, use the contact information that we have provided for nearby clinics.  Return to the emergency department for worsening or severe symptoms that cannot be managed by primary care, or that occur before you are able to see your regular doctor.      Chest Pain, Nonspecific  It is often hard to give a specific diagnosis for the cause of chest pain. There is always a chance that your pain could be related to something serious, like a heart attack or a blood clot in the lungs. You need to follow up with your caregiver for further evaluation. More lab tests or other studies such as X-rays, electrocardiography, stress testing, or cardiac imaging may be needed to find the cause of your pain.  Most of the time, nonspecific chest pain improves within 2 to 3 days with rest and mild pain medicine. For the next few days, avoid physical exertion or activities that bring on pain. Do not smoke. Avoid drinking alcohol. Call your caregiver for routine follow-up as advised.   SEEK IMMEDIATE MEDICAL CARE IF:  · You develop increased chest pain or pain that radiates to the arm, neck, jaw, back, or abdomen.   · You develop shortness of breath, increased coughing, or you start coughing up blood.   · You have severe back or abdominal pain, nausea, or vomiting.   · You develop severe weakness, fainting, fever, or chills.   Document Released: 12/18/2006 Document Revised: 03/11/2013 Document Reviewed: 06/06/2008  [employer]® Patient Information ©2013 [employer].

## 2018-12-12 ENCOUNTER — OFFICE VISIT (OUTPATIENT)
Dept: URGENT CARE | Facility: CLINIC | Age: 23
End: 2018-12-12
Payer: MEDICAID

## 2018-12-12 VITALS
HEIGHT: 63 IN | BODY MASS INDEX: 39.16 KG/M2 | DIASTOLIC BLOOD PRESSURE: 76 MMHG | SYSTOLIC BLOOD PRESSURE: 128 MMHG | RESPIRATION RATE: 14 BRPM | WEIGHT: 221 LBS | TEMPERATURE: 97.8 F | HEART RATE: 96 BPM | OXYGEN SATURATION: 96 %

## 2018-12-12 DIAGNOSIS — B00.1 HERPES LABIALIS: ICD-10-CM

## 2018-12-12 DIAGNOSIS — B00.9 HERPES SIMPLEX VIRUS (HSV) INFECTION: ICD-10-CM

## 2018-12-12 PROCEDURE — 99214 OFFICE O/P EST MOD 30 MIN: CPT | Performed by: PHYSICIAN ASSISTANT

## 2018-12-12 RX ORDER — ACYCLOVIR 200 MG/1
CAPSULE ORAL
Qty: 35 CAP | Refills: 0 | Status: SHIPPED | OUTPATIENT
Start: 2018-12-12 | End: 2022-02-13

## 2018-12-12 ASSESSMENT — ENCOUNTER SYMPTOMS
ANOREXIA: 0
SORE THROAT: 0
COUGH: 0
EYE PAIN: 0
SHORTNESS OF BREATH: 0
RHINORRHEA: 0
FATIGUE: 0
DIARRHEA: 0
FEVER: 0

## 2018-12-12 NOTE — PROGRESS NOTES
"Subjective:      Elisha Hubbard is a 23 y.o. female who presents with Cold Sores            Rash   This is a new problem. The current episode started yesterday. The problem is unchanged. Location: lips. The rash is characterized by blistering, burning and redness. She was exposed to nothing. Pertinent negatives include no anorexia, congestion, cough, diarrhea, eye pain, facial edema, fatigue, fever, rhinorrhea, shortness of breath or sore throat. Past treatments include nothing. The treatment provided no relief. There is no history of allergies or varicella.       Review of Systems   Constitutional: Negative for fatigue and fever.   HENT: Negative for congestion, rhinorrhea and sore throat.    Eyes: Negative for pain.   Respiratory: Negative for cough and shortness of breath.    Gastrointestinal: Negative for anorexia and diarrhea.   Skin: Positive for rash.          Objective:     /76 (BP Location: Right arm, Patient Position: Sitting, BP Cuff Size: Adult)   Pulse 96   Temp 36.6 °C (97.8 °F) (Temporal)   Resp 14   Ht 1.6 m (5' 3\")   Wt 100.2 kg (221 lb)   SpO2 96%   BMI 39.15 kg/m²      Physical Exam   Constitutional: She is oriented to person, place, and time. She appears well-developed and well-nourished.   HENT:   Head: Normocephalic and atraumatic.   Mouth/Throat:       Grouped vesicles on a red base on the lower lip vermilion border.  Small amount of group vesicles just under the right nares as well.  They are also on a red base   Eyes: Pupils are equal, round, and reactive to light. EOM are normal.   Neck: Normal range of motion. Neck supple.   Cardiovascular: Normal rate, regular rhythm and normal heart sounds.    Pulmonary/Chest: Effort normal and breath sounds normal.   Abdominal: Soft.   Musculoskeletal: Normal range of motion.   Neurological: She is alert and oriented to person, place, and time.               Assessment/Plan:     1. Herpes simplex virus (HSV) infection    - " acyclovir (ZOVIRAX) 200 MG Cap; Take one pill five times daily for seven days  Dispense: 35 Cap; Refill: 0    2. Herpes labialis    - acyclovir (ZOVIRAX) 200 MG Cap; Take one pill five times daily for seven days  Dispense: 35 Cap; Refill: 0      If symptoms persist or worsen please follow-up with us as needed

## 2018-12-12 NOTE — LETTER
December 12, 2018         Patient: Elisha Hubbard   YOB: 1995   Date of Visit: 12/12/2018           To Whom it May Concern:    Elisha Hubbard was seen in my clinic on 12/12/2018.  Please excuse from work December 12 and December 13, 2018. If you have any questions or concerns, please don't hesitate to call.        Sincerely,           Sherice Witt P.A.-C.  Electronically Signed

## 2019-01-03 ENCOUNTER — OFFICE VISIT (OUTPATIENT)
Dept: URGENT CARE | Facility: PHYSICIAN GROUP | Age: 24
End: 2019-01-03
Payer: MEDICAID

## 2019-01-03 VITALS
SYSTOLIC BLOOD PRESSURE: 136 MMHG | HEART RATE: 85 BPM | BODY MASS INDEX: 38.8 KG/M2 | RESPIRATION RATE: 16 BRPM | HEIGHT: 63 IN | OXYGEN SATURATION: 96 % | DIASTOLIC BLOOD PRESSURE: 90 MMHG | TEMPERATURE: 97.9 F | WEIGHT: 219 LBS

## 2019-01-03 DIAGNOSIS — J20.9 ACUTE BRONCHITIS WITH BRONCHOSPASM: ICD-10-CM

## 2019-01-03 DIAGNOSIS — J45.20 MILD INTERMITTENT ASTHMA WITHOUT COMPLICATION: ICD-10-CM

## 2019-01-03 PROCEDURE — 99214 OFFICE O/P EST MOD 30 MIN: CPT | Performed by: NURSE PRACTITIONER

## 2019-01-03 RX ORDER — DOXYCYCLINE HYCLATE 100 MG/1
100 CAPSULE ORAL 2 TIMES DAILY
Qty: 14 CAP | Refills: 0 | Status: SHIPPED | OUTPATIENT
Start: 2019-01-03 | End: 2019-01-10

## 2019-01-03 RX ORDER — ALBUTEROL SULFATE 90 UG/1
2 AEROSOL, METERED RESPIRATORY (INHALATION) EVERY 6 HOURS PRN
Qty: 8.5 G | Refills: 0 | Status: SHIPPED | OUTPATIENT
Start: 2019-01-03 | End: 2022-02-13

## 2019-01-03 RX ORDER — PREDNISONE 20 MG/1
TABLET ORAL
Qty: 10 TAB | Refills: 0 | Status: SHIPPED | OUTPATIENT
Start: 2019-01-03 | End: 2022-02-13

## 2019-01-03 ASSESSMENT — ENCOUNTER SYMPTOMS
WEAKNESS: 0
VOMITING: 0
TINGLING: 0
HEMOPTYSIS: 0
SENSORY CHANGE: 0
FEVER: 0
FOCAL WEAKNESS: 0
SPUTUM PRODUCTION: 1
NAUSEA: 0
MYALGIAS: 1
WHEEZING: 0
DIARRHEA: 0
HEADACHES: 1
SHORTNESS OF BREATH: 0
ORTHOPNEA: 0
COUGH: 1
SORE THROAT: 0
EYE DISCHARGE: 0
BACK PAIN: 1
CHILLS: 0

## 2019-01-04 NOTE — PROGRESS NOTES
Subjective:      Elisha Hubbard is a 23 y.o. female who presents with Nasal Congestion (x 1 week ) and Cough            HPI New problem. 23 year old female with nasal congestion and cough for one week. She denies fever, chills, myalgia, sore throat or nausea. She does report wheezing and shortness of breath. She does have asthma and she is a smoker. Taking ibuprofen and nyquil for her symptoms. Mother ill with similar symptoms.  Latex; Nkda [no known drug allergy]; and Tape  Current Outpatient Prescriptions on File Prior to Visit   Medication Sig Dispense Refill   • methadone (DOLOPHINE) 40 MG TABLET SOLUBLE Take 40 mg by mouth every day.     • acyclovir (ZOVIRAX) 200 MG Cap Take one pill five times daily for seven days 35 Cap 0   • albuterol (VENTOLIN OR PROVENTIL) 108 (90 BASE) MCG/ACT AERS Inhale 2 Puffs by mouth every 6 hours as needed for Shortness of Breath. (Patient not taking: Reported on 12/12/2018) 1 Inhaler 3     No current facility-administered medications on file prior to visit.      Social History     Social History   • Marital status: Single     Spouse name: N/A   • Number of children: N/A   • Years of education: N/A     Occupational History   • Not on file.     Social History Main Topics   • Smoking status: Current Every Day Smoker     Packs/day: 0.50     Types: Cigarettes   • Smokeless tobacco: Never Used   • Alcohol use No   • Drug use: Yes     Types: Intravenous      Comment: heroin; in recovery, relapsed 4 days ago 7/14   • Sexual activity: Not on file     Other Topics Concern   • Not on file     Social History Narrative   • No narrative on file     family history is not on file.      Review of Systems   Constitutional: Positive for malaise/fatigue. Negative for chills and fever.   HENT: Positive for congestion. Negative for sore throat.    Eyes: Negative for discharge.   Respiratory: Positive for cough and sputum production. Negative for hemoptysis, shortness of breath and wheezing.   "  Cardiovascular: Positive for chest pain. Negative for orthopnea.   Gastrointestinal: Negative for diarrhea, nausea and vomiting.   Genitourinary: Negative for dysuria.   Musculoskeletal: Positive for back pain and myalgias.   Neurological: Positive for headaches. Negative for tingling, sensory change, focal weakness and weakness.   Endo/Heme/Allergies: Negative for environmental allergies.          Objective:     /90   Pulse 85   Temp 36.6 °C (97.9 °F) (Temporal)   Resp 16   Ht 1.6 m (5' 3\")   Wt 99.3 kg (219 lb)   SpO2 96%   BMI 38.79 kg/m²      Physical Exam   Constitutional: She is oriented to person, place, and time. She appears well-developed and well-nourished. No distress.   HENT:   Head: Normocephalic.   Right Ear: External ear normal.   Left Ear: External ear normal.   Nose: Mucosal edema and rhinorrhea present.   Mouth/Throat: Oropharynx is clear and moist. No posterior oropharyngeal erythema.   Eyes: Conjunctivae are normal. Right eye exhibits no discharge. Left eye exhibits no discharge.   Neck: Normal range of motion. Neck supple.   Cardiovascular: Normal rate, regular rhythm and normal heart sounds.    Pulmonary/Chest: Effort normal. She has wheezes.   +expiratory rhonchi   Musculoskeletal: Normal range of motion.   Lymphadenopathy:     She has no cervical adenopathy.   Neurological: She is alert and oriented to person, place, and time.   Skin: Skin is warm and dry.   Psychiatric: She has a normal mood and affect. Her behavior is normal. Thought content normal.               Assessment/Plan:     1. Acute bronchitis with bronchospasm  albuterol 108 (90 Base) MCG/ACT Aero Soln inhalation aerosol    predniSONE (DELTASONE) 20 MG Tab    doxycycline (VIBRAMYCIN) 100 MG Cap   2. Mild intermittent asthma without complication       Differential diagnosis, natural history, supportive care, and indications for immediate follow-up discussed at length.         "

## 2019-02-08 ENCOUNTER — OFFICE VISIT (OUTPATIENT)
Dept: URGENT CARE | Facility: PHYSICIAN GROUP | Age: 24
End: 2019-02-08
Payer: MEDICAID

## 2019-02-08 VITALS
HEART RATE: 63 BPM | TEMPERATURE: 97 F | DIASTOLIC BLOOD PRESSURE: 72 MMHG | BODY MASS INDEX: 38.79 KG/M2 | RESPIRATION RATE: 16 BRPM | SYSTOLIC BLOOD PRESSURE: 108 MMHG | WEIGHT: 219 LBS | OXYGEN SATURATION: 95 %

## 2019-02-08 DIAGNOSIS — F11.93 OPIATE WITHDRAWAL (HCC): ICD-10-CM

## 2019-02-08 PROCEDURE — 99214 OFFICE O/P EST MOD 30 MIN: CPT | Performed by: FAMILY MEDICINE

## 2019-02-08 RX ORDER — LORAZEPAM 1 MG/1
1 TABLET ORAL EVERY 6 HOURS PRN
Qty: 30 TAB | Refills: 0 | Status: SHIPPED | OUTPATIENT
Start: 2019-02-08 | End: 2019-02-18

## 2019-02-08 RX ORDER — METHOCARBAMOL 500 MG/1
1000 TABLET, FILM COATED ORAL 4 TIMES DAILY
Qty: 80 TAB | Refills: 0 | Status: SHIPPED | OUTPATIENT
Start: 2019-02-08 | End: 2019-02-18

## 2019-02-18 ASSESSMENT — ENCOUNTER SYMPTOMS
FOCAL WEAKNESS: 0
ABDOMINAL PAIN: 0
DIARRHEA: 0
CHILLS: 0
PALPITATIONS: 0
SENSORY CHANGE: 0
BLURRED VISION: 0
FEVER: 0
DOUBLE VISION: 0
SHORTNESS OF BREATH: 0

## 2019-02-18 NOTE — PROGRESS NOTES
Subjective:      Elisha Hubbard is a 24 y.o. female who presents with Anxiety (DC from [facility] ) and Generalized Body Aches            Discharged yesterday from an inpatient opiate withdrawal.  Continues to have withdrawal symptoms including myalgia, anxiety, and insomnia.  No current nausea or vomiting.  No mental status changes.  Patient notes that she did well with Robaxin and Ativan.  No other aggravating or alleviating factors.        Review of Systems   Constitutional: Negative for chills and fever.   Eyes: Negative for blurred vision and double vision.   Respiratory: Negative for shortness of breath.    Cardiovascular: Negative for chest pain and palpitations.   Gastrointestinal: Negative for abdominal pain and diarrhea.   Musculoskeletal: Negative for joint pain.   Skin: Negative for itching and rash.   Neurological: Negative for sensory change and focal weakness.     .  Medications, Allergies, and current problem list reviewed today in Epic       Objective:     /72   Pulse 63   Temp 36.1 °C (97 °F)   Resp 16   Wt 99.3 kg (219 lb)   SpO2 95%   BMI 38.79 kg/m²      Physical Exam   Constitutional: She is oriented to person, place, and time. She appears well-developed and well-nourished. No distress.   HENT:   Head: Normocephalic.   Eyes: Conjunctivae are normal.   Neck: Neck supple.   Cardiovascular: Normal rate, regular rhythm and normal heart sounds.    No murmur heard.  Pulmonary/Chest: Effort normal and breath sounds normal. She has no wheezes.   Lymphadenopathy:     She has no cervical adenopathy.   Neurological: She is alert and oriented to person, place, and time.   Skin: Skin is warm and dry. No rash noted.   Psychiatric: She has a normal mood and affect. Her behavior is normal. Judgment and thought content normal.               Assessment/Plan:     1. Opiate withdrawal (HCC)    - methocarbamol (ROBAXIN) 500 MG Tab; Take 2 Tabs by mouth 4 times a day for 10 days.  Dispense: 80  Tab; Refill: 0  - LORazepam (ATIVAN) 1 MG Tab; Take 1 Tab by mouth every 6 hours as needed for Anxiety for up to 10 days.  Dispense: 30 Tab; Refill: 0    Differential diagnosis, natural history, supportive care, and indications for immediate follow-up discussed at length.     I discussed with the patient that this is out of the normal scope of practice for urgent care.  She has presented with her sister and a male who lives with them who are all trying to stop opiates.  At this point I think is reasonable to try to help.  She understands that she will need to taper the Ativan and this will continue to be refilled through the urgent care.

## 2022-02-10 ENCOUNTER — OFFICE VISIT (OUTPATIENT)
Dept: URGENT CARE | Facility: CLINIC | Age: 27
End: 2022-02-10
Payer: MEDICAID

## 2022-02-13 ENCOUNTER — HOSPITAL ENCOUNTER (OUTPATIENT)
Facility: MEDICAL CENTER | Age: 27
End: 2022-02-13
Attending: NURSE PRACTITIONER
Payer: MEDICAID

## 2022-02-13 ENCOUNTER — APPOINTMENT (OUTPATIENT)
Dept: RADIOLOGY | Facility: IMAGING CENTER | Age: 27
End: 2022-02-13
Attending: NURSE PRACTITIONER
Payer: MEDICAID

## 2022-02-13 ENCOUNTER — OFFICE VISIT (OUTPATIENT)
Dept: URGENT CARE | Facility: CLINIC | Age: 27
End: 2022-02-13
Payer: MEDICAID

## 2022-02-13 VITALS
HEART RATE: 85 BPM | OXYGEN SATURATION: 93 % | RESPIRATION RATE: 16 BRPM | HEIGHT: 63 IN | SYSTOLIC BLOOD PRESSURE: 132 MMHG | WEIGHT: 263.4 LBS | TEMPERATURE: 96.5 F | BODY MASS INDEX: 46.67 KG/M2 | DIASTOLIC BLOOD PRESSURE: 84 MMHG

## 2022-02-13 DIAGNOSIS — J22 LRTI (LOWER RESPIRATORY TRACT INFECTION): ICD-10-CM

## 2022-02-13 DIAGNOSIS — R06.02 SHORTNESS OF BREATH: ICD-10-CM

## 2022-02-13 LAB — COVID ORDER STATUS COVID19: NORMAL

## 2022-02-13 PROCEDURE — U0005 INFEC AGEN DETEC AMPLI PROBE: HCPCS

## 2022-02-13 PROCEDURE — 71046 X-RAY EXAM CHEST 2 VIEWS: CPT | Mod: TC | Performed by: NURSE PRACTITIONER

## 2022-02-13 PROCEDURE — 99214 OFFICE O/P EST MOD 30 MIN: CPT | Performed by: NURSE PRACTITIONER

## 2022-02-13 PROCEDURE — U0003 INFECTIOUS AGENT DETECTION BY NUCLEIC ACID (DNA OR RNA); SEVERE ACUTE RESPIRATORY SYNDROME CORONAVIRUS 2 (SARS-COV-2) (CORONAVIRUS DISEASE [COVID-19]), AMPLIFIED PROBE TECHNIQUE, MAKING USE OF HIGH THROUGHPUT TECHNOLOGIES AS DESCRIBED BY CMS-2020-01-R: HCPCS

## 2022-02-13 RX ORDER — ALBUTEROL SULFATE 90 UG/1
2 AEROSOL, METERED RESPIRATORY (INHALATION) EVERY 6 HOURS PRN
Qty: 8.5 G | Refills: 0 | Status: SHIPPED | OUTPATIENT
Start: 2022-02-13

## 2022-02-13 RX ORDER — AZITHROMYCIN 250 MG/1
TABLET, FILM COATED ORAL
Qty: 6 TABLET | Refills: 0 | Status: SHIPPED | OUTPATIENT
Start: 2022-02-13

## 2022-02-13 RX ORDER — PREDNISONE 10 MG/1
40 TABLET ORAL DAILY
Qty: 20 TABLET | Refills: 0 | Status: SHIPPED | OUTPATIENT
Start: 2022-02-13 | End: 2022-02-18

## 2022-02-13 ASSESSMENT — ENCOUNTER SYMPTOMS
CHILLS: 1
RHINORRHEA: 1
MYALGIAS: 0
SHORTNESS OF BREATH: 1
SPUTUM PRODUCTION: 1
NAUSEA: 0
SORE THROAT: 1
EYE PAIN: 0
FEVER: 0
WHEEZING: 1
COUGH: 1
HEADACHES: 1
DIZZINESS: 0
VOMITING: 0

## 2022-02-13 NOTE — PROGRESS NOTES
Subjective:   Elisha Hubbard is a 27 y.o. female who presents for Coronavirus Screening (chills, sore throat, SOB, cough, congestion, runny nose, green phlem x1 week)      URI   This is a new problem. The current episode started in the past 7 days. The problem has been gradually worsening. There has been no fever. Associated symptoms include congestion, coughing, headaches, rhinorrhea, a sore throat and wheezing. Pertinent negatives include no chest pain, ear pain, nausea, plugged ear sensation, rash or vomiting. Associated symptoms comments: Sob  . She has tried acetaminophen and sleep for the symptoms. The treatment provided no relief.       Review of Systems   Constitutional: Positive for chills and malaise/fatigue. Negative for fever.   HENT: Positive for congestion, rhinorrhea and sore throat. Negative for ear pain.    Eyes: Negative for pain.   Respiratory: Positive for cough, sputum production, shortness of breath and wheezing.    Cardiovascular: Negative for chest pain.   Gastrointestinal: Negative for nausea and vomiting.   Genitourinary: Negative for hematuria.   Musculoskeletal: Negative for myalgias.   Skin: Negative for rash.   Neurological: Positive for headaches. Negative for dizziness.       Medications:    • albuterol Aers  • methadone Tbso    Allergies: Latex, Nkda [no known drug allergy], and Tape    Problem List: Elisha Hubbard does not have a problem list on file.    Surgical History:  Past Surgical History:   Procedure Laterality Date   • APPENDECTOMY  11/28/2009   • OK APPENDECTOMY  11/2009   • TONSILLECTOMY AND ADENOIDECTOMY         Past Social Hx: Elisha Hubbard  reports that she has been smoking cigarettes. She has been smoking about 0.50 packs per day. She has never used smokeless tobacco. She reports current drug use. Drug: Intravenous. She reports that she does not drink alcohol.     Past Family Hx:  Elisha Hubbard family history is not on  "file.     Problem list, medications, and allergies reviewed by myself today in Epic.     Objective:     /84 (BP Location: Right arm, Patient Position: Sitting, BP Cuff Size: Adult)   Pulse 85   Temp 35.8 °C (96.5 °F) (Temporal)   Resp 16   Ht 1.6 m (5' 3\")   Wt 119 kg (263 lb 6.4 oz)   SpO2 93%   BMI 46.66 kg/m²     Physical Exam  Vitals and nursing note reviewed.   Constitutional:       General: She is not in acute distress.     Appearance: She is well-developed.   HENT:      Head: Normocephalic and atraumatic.      Right Ear: Tympanic membrane and external ear normal.      Left Ear: Tympanic membrane and external ear normal.      Nose: Nose normal.      Right Sinus: No maxillary sinus tenderness or frontal sinus tenderness.      Left Sinus: No maxillary sinus tenderness or frontal sinus tenderness.      Mouth/Throat:      Mouth: Mucous membranes are moist.      Pharynx: Uvula midline. No posterior oropharyngeal erythema.      Tonsils: No tonsillar exudate or tonsillar abscesses.   Eyes:      General:         Right eye: No discharge.         Left eye: No discharge.      Conjunctiva/sclera: Conjunctivae normal.   Cardiovascular:      Rate and Rhythm: Normal rate.   Pulmonary:      Effort: Pulmonary effort is normal. No respiratory distress.      Breath sounds: Wheezing and rhonchi present.   Abdominal:      General: There is no distension.   Musculoskeletal:         General: Normal range of motion.   Skin:     General: Skin is warm and dry.   Neurological:      General: No focal deficit present.      Mental Status: She is alert and oriented to person, place, and time. Mental status is at baseline.      Gait: Gait (gait at baseline) normal.   Psychiatric:         Judgment: Judgment normal.         Assessment/Plan:     Diagnosis and associated orders:     1. Shortness of breath  DX-CHEST-2 VIEWS    predniSONE (DELTASONE) 10 MG Tab    azithromycin (ZITHROMAX) 250 MG Tab    albuterol 108 (90 Base) MCG/ACT " Aero Soln inhalation aerosol    SARS-CoV-2 PCR (24 hour In-House): Collect NP swab in VTM   2. LRTI (lower respiratory tract infection)  predniSONE (DELTASONE) 10 MG Tab    azithromycin (ZITHROMAX) 250 MG Tab        Comments/MDM:     I independently reviewed the patient's imaging and agree with the interpretation of the radiologist.    No active disease.      Patient is a 27-year-old female present with the stated above, patient having worsening symptoms x7 days, will test PCR as she has had an exposure to Covid however we will treat for suspected bacterial infection. Advised to continue supportive care with Tylenol and/or ibuprofen for fevers and discomfort. Increased fluids and electrolytes. Differential diagnosis, natural history, supportive care, and indications for immediate follow-up discussed.         My total time spent caring for the patient on the day of the encounter was 30 minutes.   This does not include time spent on separately billable procedures/tests.    Please note that this dictation was created using voice recognition software. I have made a reasonable attempt to correct obvious errors, but I expect that there are errors of grammar and possibly content that I did not discover before finalizing the note.    This note was electronically signed by Jerry SNOWDEN.

## 2022-02-14 LAB
SARS-COV-2 RNA RESP QL NAA+PROBE: DETECTED
SPECIMEN SOURCE: ABNORMAL

## 2023-11-07 NOTE — ED NOTES
New diagnosis she is not symptomatic would continue current medication she is rate controlled but start anticoagulation due to risk of stroke.   Start Xarelto 15 mg daily watch hemoglobin closely if she has falls or injuries we will debate watchman instead continue rate control strategy with digoxin 125 mg daily but watch for toxicity she has been on digoxin for several years now Patient ambulated to restroom with steady gait.

## 2024-06-09 ENCOUNTER — APPOINTMENT (OUTPATIENT)
Dept: RADIOLOGY | Facility: MEDICAL CENTER | Age: 29
End: 2024-06-09
Attending: STUDENT IN AN ORGANIZED HEALTH CARE EDUCATION/TRAINING PROGRAM

## 2024-06-09 ENCOUNTER — HOSPITAL ENCOUNTER (EMERGENCY)
Facility: MEDICAL CENTER | Age: 29
End: 2024-06-10
Attending: EMERGENCY MEDICINE

## 2024-06-09 DIAGNOSIS — K59.00 CONSTIPATION, UNSPECIFIED CONSTIPATION TYPE: ICD-10-CM

## 2024-06-09 LAB
ALBUMIN SERPL BCP-MCNC: 4.4 G/DL (ref 3.2–4.9)
ALBUMIN/GLOB SERPL: 2.2 G/DL
ALP SERPL-CCNC: 96 U/L (ref 30–99)
ALT SERPL-CCNC: 8 U/L (ref 2–50)
ANION GAP SERPL CALC-SCNC: 18 MMOL/L (ref 7–16)
AST SERPL-CCNC: 14 U/L (ref 12–45)
BASOPHILS # BLD AUTO: 0.3 % (ref 0–1.8)
BASOPHILS # BLD: 0.04 K/UL (ref 0–0.12)
BILIRUB SERPL-MCNC: 0.7 MG/DL (ref 0.1–1.5)
BUN SERPL-MCNC: 11 MG/DL (ref 8–22)
CALCIUM ALBUM COR SERPL-MCNC: 9.5 MG/DL (ref 8.5–10.5)
CALCIUM SERPL-MCNC: 9.8 MG/DL (ref 8.5–10.5)
CHLORIDE SERPL-SCNC: 104 MMOL/L (ref 96–112)
CO2 SERPL-SCNC: 19 MMOL/L (ref 20–33)
CREAT SERPL-MCNC: 0.67 MG/DL (ref 0.5–1.4)
EOSINOPHIL # BLD AUTO: 0.03 K/UL (ref 0–0.51)
EOSINOPHIL NFR BLD: 0.2 % (ref 0–6.9)
ERYTHROCYTE [DISTWIDTH] IN BLOOD BY AUTOMATED COUNT: 40.2 FL (ref 35.9–50)
GFR SERPLBLD CREATININE-BSD FMLA CKD-EPI: 119 ML/MIN/1.73 M 2
GLOBULIN SER CALC-MCNC: 2 G/DL (ref 1.9–3.5)
GLUCOSE SERPL-MCNC: 85 MG/DL (ref 65–99)
HCG SERPL QL: NEGATIVE
HCT VFR BLD AUTO: 43.9 % (ref 37–47)
HGB BLD-MCNC: 15.1 G/DL (ref 12–16)
IMM GRANULOCYTES # BLD AUTO: 0.06 K/UL (ref 0–0.11)
IMM GRANULOCYTES NFR BLD AUTO: 0.5 % (ref 0–0.9)
LIPASE SERPL-CCNC: 11 U/L (ref 11–82)
LYMPHOCYTES # BLD AUTO: 1.99 K/UL (ref 1–4.8)
LYMPHOCYTES NFR BLD: 15.7 % (ref 22–41)
MCH RBC QN AUTO: 29.8 PG (ref 27–33)
MCHC RBC AUTO-ENTMCNC: 34.4 G/DL (ref 32.2–35.5)
MCV RBC AUTO: 86.6 FL (ref 81.4–97.8)
MONOCYTES # BLD AUTO: 0.62 K/UL (ref 0–0.85)
MONOCYTES NFR BLD AUTO: 4.9 % (ref 0–13.4)
NEUTROPHILS # BLD AUTO: 9.97 K/UL (ref 1.82–7.42)
NEUTROPHILS NFR BLD: 78.4 % (ref 44–72)
NRBC # BLD AUTO: 0 K/UL
NRBC BLD-RTO: 0 /100 WBC (ref 0–0.2)
PLATELET # BLD AUTO: 293 K/UL (ref 164–446)
PMV BLD AUTO: 10.4 FL (ref 9–12.9)
POTASSIUM SERPL-SCNC: 4.2 MMOL/L (ref 3.6–5.5)
PROT SERPL-MCNC: 6.4 G/DL (ref 6–8.2)
RBC # BLD AUTO: 5.07 M/UL (ref 4.2–5.4)
SODIUM SERPL-SCNC: 141 MMOL/L (ref 135–145)
WBC # BLD AUTO: 12.7 K/UL (ref 4.8–10.8)

## 2024-06-09 PROCEDURE — 700101 HCHG RX REV CODE 250: Performed by: STUDENT IN AN ORGANIZED HEALTH CARE EDUCATION/TRAINING PROGRAM

## 2024-06-09 PROCEDURE — 96372 THER/PROPH/DIAG INJ SC/IM: CPT

## 2024-06-09 PROCEDURE — 700111 HCHG RX REV CODE 636 W/ 250 OVERRIDE (IP): Mod: JZ | Performed by: STUDENT IN AN ORGANIZED HEALTH CARE EDUCATION/TRAINING PROGRAM

## 2024-06-09 PROCEDURE — 80053 COMPREHEN METABOLIC PANEL: CPT

## 2024-06-09 PROCEDURE — 74018 RADEX ABDOMEN 1 VIEW: CPT

## 2024-06-09 PROCEDURE — 85025 COMPLETE CBC W/AUTO DIFF WBC: CPT

## 2024-06-09 PROCEDURE — 99284 EMERGENCY DEPT VISIT MOD MDM: CPT

## 2024-06-09 PROCEDURE — 83690 ASSAY OF LIPASE: CPT

## 2024-06-09 PROCEDURE — 36415 COLL VENOUS BLD VENIPUNCTURE: CPT

## 2024-06-09 PROCEDURE — 84703 CHORIONIC GONADOTROPIN ASSAY: CPT

## 2024-06-09 RX ORDER — LIDOCAINE HYDROCHLORIDE 20 MG/ML
JELLY TOPICAL
Status: COMPLETED | OUTPATIENT
Start: 2024-06-09 | End: 2024-06-09

## 2024-06-09 RX ORDER — ENEMA 19; 7 G/133ML; G/133ML
1 ENEMA RECTAL ONCE
Status: COMPLETED | OUTPATIENT
Start: 2024-06-09 | End: 2024-06-09

## 2024-06-09 RX ORDER — KETOROLAC TROMETHAMINE 15 MG/ML
15 INJECTION, SOLUTION INTRAMUSCULAR; INTRAVENOUS ONCE
Status: DISCONTINUED | OUTPATIENT
Start: 2024-06-09 | End: 2024-06-09

## 2024-06-09 RX ORDER — KETOROLAC TROMETHAMINE 15 MG/ML
15 INJECTION, SOLUTION INTRAMUSCULAR; INTRAVENOUS ONCE
Status: COMPLETED | OUTPATIENT
Start: 2024-06-09 | End: 2024-06-09

## 2024-06-09 RX ORDER — MIDAZOLAM HYDROCHLORIDE 1 MG/ML
2 INJECTION INTRAMUSCULAR; INTRAVENOUS ONCE
Status: DISCONTINUED | OUTPATIENT
Start: 2024-06-09 | End: 2024-06-10 | Stop reason: HOSPADM

## 2024-06-09 RX ADMIN — LIDOCAINE HYDROCHLORIDE 2 ML: 20 JELLY TOPICAL at 21:56

## 2024-06-09 RX ADMIN — SODIUM PHOSPHATE 133 ML: 7; 19 ENEMA RECTAL at 20:50

## 2024-06-09 RX ADMIN — KETOROLAC TROMETHAMINE 15 MG: 15 INJECTION, SOLUTION INTRAMUSCULAR; INTRAVENOUS at 22:46

## 2024-06-10 VITALS
HEIGHT: 63 IN | TEMPERATURE: 97.6 F | DIASTOLIC BLOOD PRESSURE: 55 MMHG | OXYGEN SATURATION: 97 % | WEIGHT: 260 LBS | HEART RATE: 73 BPM | SYSTOLIC BLOOD PRESSURE: 101 MMHG | BODY MASS INDEX: 46.07 KG/M2 | RESPIRATION RATE: 20 BRPM

## 2024-06-10 RX ORDER — BISACODYL 10 MG
10 SUPPOSITORY, RECTAL RECTAL DAILY
Qty: 5 SUPPOSITORY | Refills: 0 | Status: SHIPPED | OUTPATIENT
Start: 2024-06-10

## 2024-06-10 RX ORDER — POLYETHYLENE GLYCOL 3350 17 G/17G
17 POWDER, FOR SOLUTION ORAL 2 TIMES DAILY
Qty: 8 PACKET | Refills: 0 | Status: SHIPPED | OUTPATIENT
Start: 2024-06-10 | End: 2024-06-14

## 2024-06-10 NOTE — ED TRIAGE NOTES
"Chief Complaint   Patient presents with    Abdominal Pain     [place] pain that started today    Bloody Stools     Started today    Constipation     Reports constipation x \"3 months\".     Describes pain as \"burning and stabbing\". Was given fentanyl 50 mcg IN and zofran 4mg sl by ems.   Educated on triage process. Instructed to notify staff for any worsening symptoms. Denies any recent travel. Denies exposure to known covid positive patients. Denies any respiratory symptoms.  Vitals:    06/09/24 1856   BP: 116/74   Pulse: 67   Resp: 16   Temp: 36.5 °C (97.7 °F)   SpO2: 98%       "

## 2024-06-10 NOTE — ED NOTES
Pt has stool in pants and on buttock. Pt shower prior to room.    She reports she has been constipated x3 months. She attempted to have a BM today and felt that she is impacted at the rectum.

## 2024-06-10 NOTE — ED NOTES
"Noted \"brown stool color on the floor\" when RN about to wheel pt in triage room. States that her poop accidentally came out from her pants and states that she told ems initially to take her pants off but they wouldn't take it off.  "

## 2024-06-10 NOTE — ED NOTES
PT given hot packs and extra warm blankets. PT given some large pants to wear when she is discharged.

## 2024-06-10 NOTE — ED NOTES
Patient has been provided written and verbal education on constipation. 2 prescribed mediations have been sent to her pharmacy. She verbalized understanding of how to take those medications. She is ambulatory in room and wash clothes provided for her to clean herself.

## 2024-06-10 NOTE — DISCHARGE INSTRUCTIONS
Please take MiraLAX twice daily for the next 4 days and use a Dulcolax suppository daily for the next 4 to 5 days.  Drink plenty of liquids and stick to a bland thin diet including frequent liquids, soups, smoothies for the next few days.  Follow-up with your primary care doctor for ongoing management.

## 2024-06-10 NOTE — ED PROVIDER NOTES
"ED Provider Note    CHIEF COMPLAINT  Chief Complaint   Patient presents with    Abdominal Pain     [place] jeffrey[place] abdominal pain that started today    Bloody Stools     Started today    Constipation     Reports constipation x \"3 months\".       EXTERNAL RECORDS REVIEWED  Outpatient Notes last available record from cardiology service reviewed from 8/30/2023.  History of hypertension and CHF, asthma    HPI/ROS  LIMITATION TO HISTORY   Select: : None    Elisha Hubbard is a 29 y.o. female who presents to the ER for evaluation of abdominal pain and constipation.  The patient reports that she \"has not had a bowel movement in 3 months\".  She reports that today she felt the need to have a large bowel movement [place], attempted to squat down and passed a large bowel movement and due to the severity of pain associated with such felt the need to squat down in the river.  EMS was then called and administered 50 mcg of intranasal fentanyl and 4 mg of Zofran.  The patient denies nausea or vomiting.  She denies specific abdominal pain but rather reports rectal pain associated with feeling the need to have a large bowel movement.  She denies any pain with urination or increased urinary frequency, fevers or chills.    PAST MEDICAL HISTORY   has a past medical history of ASTHMA, H. pylori infection, Kidney infection, Migraine, and Migraine.    SURGICAL HISTORY   has a past surgical history that includes appendectomy (11/28/2009); tonsillectomy and adenoidectomy; and appendectomy (11/2009).    FAMILY HISTORY  No family history on file.    SOCIAL HISTORY  Social History     Tobacco Use    Smoking status: Every Day     Current packs/day: 0.50     Types: Cigarettes    Smokeless tobacco: Never   Vaping Use    Vaping status: Never Used   Substance and Sexual Activity    Alcohol use: No    Drug use: Yes     Types: Intravenous     Comment: heroin; in recovery, relapsed 4 days " "ago 7/14    Sexual activity: Not on file       CURRENT MEDICATIONS  Home Medications    **Home medications have not yet been reviewed for this encounter**         ALLERGIES  Allergies   Allergen Reactions    Latex Hives    Nkda [No Known Drug Allergy]     Tape        PHYSICAL EXAM  VITAL SIGNS: /74   Pulse 67   Temp 36.5 °C (97.7 °F) (Temporal)   Resp 16   Ht 1.6 m (5' 3\")   Wt 118 kg (260 lb)   SpO2 98%   BMI 46.06 kg/m²    Constitutional: Anxious, uncomfortable appearing  HEENT: Atraumatic, normocephalic, pupils are equal round reactive to light, nose normal, mouth shows moist mucous membranes  Neck: Supple, no JVD, no tracheal deviation  Cardiovascular: Regular rate and rhythm, no murmur, rub or gallop, 2+ pulses peripherally x4  Thorax & Lungs: No respiratory distress, no wheezes, rales or rhonchi, no chest wall tenderness.  GI: Soft, non-distended, non-tender, no rebound  Rectal: Performed with nurse chaperone present demonstrates a large formed stool ball within the rectal vault  Skin: Warm, dry, no acute rash or lesion  Musculoskeletal: Moving all extremities, no acute deformity, no edema, no tenderness  Neurologic: A&Ox3, at baseline mentation  Psychiatric: Tearful, anxious      EKG/LABS  Labs Reviewed   CBC WITH DIFFERENTIAL - Abnormal; Notable for the following components:       Result Value    WBC 12.7 (*)     Neutrophils-Polys 78.40 (*)     Lymphocytes 15.70 (*)     Neutrophils (Absolute) 9.97 (*)     All other components within normal limits   COMP METABOLIC PANEL - Abnormal; Notable for the following components:    Co2 19 (*)     Anion Gap 18.0 (*)     All other components within normal limits   LIPASE   HCG QUAL SERUM   ESTIMATED GFR   URINALYSIS,CULTURE IF INDICATED         RADIOLOGY/PROCEDURES   I have independently interpreted the diagnostic imaging associated with this visit and am waiting the final reading from the radiologist.   My preliminary interpretation is as follows: X-ray " demonstrating findings of constipation.  No free air.    Radiologist interpretation:  [id number] [id number]   Final Result         1.  Large quantity stool in the colon suggests changes of constipation, otherwise nonspecific bowel gas pattern in the upper abdomen          COURSE & MEDICAL DECISION MAKING    ASSESSMENT, COURSE AND PLAN  Care Narrative:     Patient presents to the emergency department for evaluation of constipation.  Patient having significant distress in the setting of an attempted bowel movement examination demonstrates a large stool ball at the anal verge.  Manual disimpaction performed with nurse chaperone with resultant break-up of multiple firm pieces of stool.  Following this an enema was administered and the patient was able to have 2-3 small to moderate-sized bowel movements with significant improvement in her discomfort.  Laboratory workup undertaken demonstrating a nonspecific some slight leukocytosis, anion gap metabolic acidosis which I suspect to be transient and related to acute anxiety/hyper analgesia.  Patient not pregnant.  No hepatic or renal dysfunction appreciated.  Patient was treated for pain with ketorolac and provided with a Uro-Jet viscous lidocaine solution to utilize as needed at home with instructions on its proper use.  X-ray performed and confirms for changes of constipation without evidence of obstruction or bowel perforation.  Patient was encouraged over multiple hours to attempt to provide a urinalysis though was ultimately disinclined to do so.  I discussed with her that she should follow-up with her primary care doctor for urine testing if she does develop any urinary symptoms though I do not feel strongly that we need to obtain this tonight.  I otherwise recommended an aggressive bowel regiment and prescribed medications for use at home.  Ultimately patient was discharged.  Return precautions discussed and all questions answered.      ADDITIONAL PROBLEMS  MANAGED  Opiate abuse disorder    DISPOSITION AND DISCUSSIONS  I have discussed management of the patient with the following physicians and EVELIA's: None    Discussion of management with other [facility] or appropriate source(s): None     Escalation of care considered, and ultimately not performed:diagnostic imaging    Decision tools and prescription drugs considered including, but not limited to: Pain Medications ketorolac, lidocaine .    FINAL DIAGNOSIS  1. Constipation, unspecified constipation type           Electronically signed by: Chase Baker M.D., 6/9/2024 8:26 PM

## 2024-06-10 NOTE — ED NOTES
Medicated with toradol.   She has been provided cloths to clean up herself. She has been educated on importance of urine sample. Water provided

## 2024-06-10 NOTE — ED NOTES
Pt was able to produce multiple hard form stools about 2-3 inches in diameter. She is more comfortable appearing now.

## 2024-06-16 NOTE — DISCHARGE PLANNING
Patient calls today to inquire about Rxs from last week.    Patient advised that these are both OTC products and no Rxs are required.    She states understanding.

## 2024-08-14 ENCOUNTER — HOSPITAL ENCOUNTER (EMERGENCY)
Facility: MEDICAL CENTER | Age: 29
End: 2024-08-14

## 2024-08-14 VITALS
HEIGHT: 63 IN | DIASTOLIC BLOOD PRESSURE: 58 MMHG | BODY MASS INDEX: 23.74 KG/M2 | OXYGEN SATURATION: 98 % | TEMPERATURE: 97.3 F | SYSTOLIC BLOOD PRESSURE: 110 MMHG | RESPIRATION RATE: 18 BRPM | HEART RATE: 78 BPM | WEIGHT: 134 LBS

## 2024-08-14 PROCEDURE — 302449 STATCHG TRIAGE ONLY (STATISTIC)

## 2024-08-14 ASSESSMENT — FIBROSIS 4 INDEX: FIB4 SCORE: 0.52
